# Patient Record
Sex: MALE | Race: WHITE | NOT HISPANIC OR LATINO | URBAN - METROPOLITAN AREA
[De-identification: names, ages, dates, MRNs, and addresses within clinical notes are randomized per-mention and may not be internally consistent; named-entity substitution may affect disease eponyms.]

---

## 2017-01-05 ENCOUNTER — IMPORTED ENCOUNTER (OUTPATIENT)
Dept: URBAN - METROPOLITAN AREA CLINIC 31 | Facility: CLINIC | Age: 72
End: 2017-01-05

## 2017-01-05 PROBLEM — Z96.1: Noted: 2017-01-05

## 2017-01-05 PROBLEM — H10.403: Noted: 2017-01-05

## 2017-01-05 PROCEDURE — 92015 DETERMINE REFRACTIVE STATE: CPT

## 2017-01-05 PROCEDURE — 92014 COMPRE OPH EXAM EST PT 1/>: CPT

## 2017-01-05 NOTE — PATIENT DISCUSSION
1.  Pseudophakia OU - IOLs stable. Monitor. 2. Allergic Conjunctivitis OU -- Continue [facility] allergy drops. 3. Glasses change optional. 4.  Return for an appointment in 1 year for comprehensive exam. with Dr. Apurva Encarnacion.

## 2017-03-14 NOTE — PATIENT DISCUSSION
Discussed with the patient the importance of good control of their blood sugar, blood pressure, cholesterol, diet, exercise, weight, and medication usage under the guidance of their diabetic doctor to prevent/halt diabetic retinopathy.

## 2017-03-14 NOTE — PATIENT DISCUSSION
Retinal tear and detachment warning symptoms reviewed and patient instructed to call immediately if increasing floaters, flashes, or decreasing peripheral vision.

## 2017-10-24 NOTE — PATIENT DISCUSSION
The patient's findings are compatible with epiretinal membrane with macular pucker. Macular pucker is usually due to previous posterior vitreous detachment but can also be due to uveitis, retinal vascular occlusion, retinal tear, retinal detachment, and idiopathic. In patients case due to Hx of RD.  Most patients with epiretinal membranes with macular pucker do not progress to the point where intervention is needed. Intervention is typically surgical. The patient can be observed carefully with retinal follow-up in a number of months. If the maculopathy progresses, surgery will be considered.

## 2017-10-24 NOTE — PATIENT DISCUSSION
Surgery discussed in detail, 85% stay same.  Sx elective.  Pt wishes to observe at this point.  No guarantee with surgery due to hx of RD.  Discussed likely will use Air fluid exchange.  Discussed guarded [facility] potential outlook.  Stressed pt should always be tested with distance RX on.

## 2017-12-12 NOTE — PATIENT DISCUSSION
***pt to f/u w/ Dr. Lori Jefferson for refraction to fill out DMV form, [facility] does not fill out form****.

## 2017-12-12 NOTE — PATIENT DISCUSSION
Discussed the importance of blood sugar control in the prevention of ocular complications.  DM could be contributing to edema so pt needs to monitor BS/A1c.  Pt states getting tested soon and diet controlled with weight watchers.

## 2018-01-10 ENCOUNTER — IMPORTED ENCOUNTER (OUTPATIENT)
Dept: URBAN - METROPOLITAN AREA CLINIC 31 | Facility: CLINIC | Age: 73
End: 2018-01-10

## 2018-01-10 PROBLEM — Z96.1: Noted: 2018-01-10

## 2018-01-10 PROBLEM — H10.403: Noted: 2018-01-10

## 2018-01-10 PROCEDURE — 92014 COMPRE OPH EXAM EST PT 1/>: CPT

## 2018-02-06 NOTE — PATIENT DISCUSSION
Surgery discussed in detail, 85% stay same.  Sx elective.  As edu last visit No guarantee with surgery due to hx of RD.  Discussed likely will use Air fluid exchange.  Discussed guarded [facility] potential outlook.  Stressed pt should always be tested with distance RX on.  ***Due to edema improving and VA 20/60 with glasses on do NOT rec surgical intervention***.

## 2018-09-25 NOTE — PATIENT DISCUSSION
Surgery discussed in detail, 85% stay same.  Sx elective.  As edu last visit No guarantee with surgery due to hx of RD.  Discussed guarded [facility] potential outlook due to hx RD so at this point do not rec surgical intervention. Stressed pt should always be tested with distance RX on.

## 2019-01-11 ENCOUNTER — IMPORTED ENCOUNTER (OUTPATIENT)
Dept: URBAN - METROPOLITAN AREA CLINIC 31 | Facility: CLINIC | Age: 74
End: 2019-01-11

## 2019-01-11 PROBLEM — H10.403: Noted: 2019-01-11

## 2019-01-11 PROBLEM — Z96.1: Noted: 2019-01-11

## 2019-01-11 PROCEDURE — 92015 DETERMINE REFRACTIVE STATE: CPT

## 2019-01-11 PROCEDURE — 92014 COMPRE OPH EXAM EST PT 1/>: CPT

## 2019-11-05 NOTE — PATIENT DISCUSSION
1.  Pseudophakia OU - IOLs stable. Monitor. 2. Allergic Conjunctivitis OU -- OTC allergy gtt PRN3. Return for an appointment in 1 year for comprehensive exam. with Dr. Flaco Barton. DISPLAY PLAN FREE TEXT

## 2020-01-13 ENCOUNTER — IMPORTED ENCOUNTER (OUTPATIENT)
Dept: URBAN - METROPOLITAN AREA CLINIC 31 | Facility: CLINIC | Age: 75
End: 2020-01-13

## 2020-01-13 PROBLEM — Z96.1: Noted: 2020-01-13

## 2020-01-13 PROCEDURE — 92015 DETERMINE REFRACTIVE STATE: CPT

## 2020-01-13 PROCEDURE — 92014 COMPRE OPH EXAM EST PT 1/>: CPT

## 2021-01-13 ENCOUNTER — IMPORTED ENCOUNTER (OUTPATIENT)
Dept: URBAN - METROPOLITAN AREA CLINIC 31 | Facility: CLINIC | Age: 76
End: 2021-01-13

## 2021-01-13 PROBLEM — Z96.1: Noted: 2021-01-13

## 2021-01-13 PROCEDURE — 92014 COMPRE OPH EXAM EST PT 1/>: CPT

## 2021-01-13 PROCEDURE — 92015 DETERMINE REFRACTIVE STATE: CPT

## 2021-01-13 NOTE — PATIENT DISCUSSION
1.  Pseudophakia OU - IOLs stable. Monitor. 2. Refractive error - Glasses change optional. 3.  Return for an appointment in 1 year for comprehensive exam with Dr. Goran Yu.

## 2021-03-04 NOTE — PATIENT DISCUSSION
The patient's findings are compatible with epiretinal membrane with macular pucker. Macular pucker is usually due to previous posterior vitreous detachment but can also be due to uveitis, retinal vascular occlusion, retinal tear, retinal detachment, and idiopathic. Most patients with epiretinal membranes with macular pucker do not progress to the point where intervention is needed. Intervention is typically surgical. The patient can be observed carefully with retinal follow-up in a number of months.

## 2021-03-16 NOTE — PROCEDURE NOTE: CLINICAL
PROCEDURE NOTE: Avastin () #1 OD. Diagnosis: Neovascular AMD with Active CNV. Anesthesia: Proparacaine 0.5%. Prep: Betadine Drops and Betadine Scrub. Prior to the original injection, risks/benefits/alternatives discussed including infection, loss of vision, hemorrhage, cataract, glaucoma, retinal tears or detachment. A written consent is on file, and the need for today’s injection was discussed and the patient is understanding and wishes to proceed. The off-label status of Intravitreal Avastin also was reviewed. The patient wished to proceed with treatment. The patient wished to proceed with treatment. Topical anesthetic drops were applied to the eye. Betadine prep was performed. Surgical mask worn. Sterile drape and lid speculum were applied. Using the syringe provided, Avastin 1.25 mg in 0.05 cc was injected into the vitreous cavity. Injection site: 3-4 mm from the limbus. Patient tolerated procedure well. Following the intravitreal injection, the sterile lid speculum was removed. CRA perfusion confirmed. CF vision checked. Patient given office phone number/answering service number and advised to call immediately should there be an increase in floaters or redness, loss of vision or pain, or should they have any other questions or concerns. Brandon Mireles

## 2021-04-22 NOTE — PATIENT DISCUSSION
Fluid resolved, minimal ME seen but parafoveal.  Option of treating with another Avastin inj again today and extending visit to 2 months out or observation and seeing pt back in 1 month.  Pt wishes to hold off on treatment at this time.  Rec rtc in 1 month sooner prn and if worsens will consider series of Avastin.

## 2021-05-27 NOTE — PATIENT DISCUSSION
Fluid resolved, ME resolved.  At this point can observe for now and tx prn.  Call with changes and RTC in 8 to 10 weeks.

## 2022-01-14 ENCOUNTER — IMPORTED ENCOUNTER (OUTPATIENT)
Dept: URBAN - METROPOLITAN AREA CLINIC 31 | Facility: CLINIC | Age: 77
End: 2022-01-14

## 2022-01-14 PROBLEM — Z96.1: Noted: 2022-01-14

## 2022-01-14 PROCEDURE — 92014 COMPRE OPH EXAM EST PT 1/>: CPT

## 2022-01-14 NOTE — PATIENT DISCUSSION
1.  Pseudophakia OU - IOLs stable. Monitor. 2. Refractive error - Glasses change optional. 3.  Return for an appointment in 1 year for comprehensive exam with Dr. Magdi Orbien.

## 2022-01-28 NOTE — PATIENT DISCUSSION
No changes seen on todays exam. The patient's findings are compatible with epiretinal membrane with macular pucker. Macular pucker is usually due to previous posterior vitreous detachment but can also be due to uveitis, retinal vascular occlusion, retinal tear, retinal detachment, and idiopathic. Most patients with epiretinal membranes with macular pucker do not progress to the point where intervention is needed. Intervention is typically surgical. The patient can be observed carefully with retinal follow-up in a number of months.

## 2022-01-28 NOTE — PATIENT DISCUSSION
Exam and OCT continue to show Fluid and ME resolved.  At this point can observe for now and tx prn. Patient agrees to observe at this time and will call with any changes. Patient agrees to observe at this time and will call immediately to report any decreased vision or visual distortion.  Return in 6 months for Comp/R.

## 2022-02-18 NOTE — PATIENT DISCUSSION
1.  Pseudophakia OU - IOLs stable. Monitor. 2. Allergic Conjunctivitis OU -- Continue OTC allergy gtt. 3. Return for an appointment in 1 year for comprehensive exam. with Dr. Carlos Price.
Allergic Conjunctivitis OU -- The condition was  discussed with the patient. Avoidance of allergens and cool compresses were recommended.
Return for an appointment in 1 year for comprehensive exam. with Dr. Dakotah Lee.
show

## 2022-04-02 ASSESSMENT — VISUAL ACUITY
OU_CC: J1
OD_SC: 20/25
OD_SC: 20/20-1
OS_SC: 20/25
OS_SC: 20/25+1
OD_SC: 20/20-1
OS_SC: 20/20-1
OS_SC: 20/20-1
OD_SC: 20/25+2
OD_CC: 20/30
OU_SC: 20/20-2
OS_CC: J1
OS_CC: J1+1
OS_CC: 20/60
OD_CC: J3
OD_CC: J1

## 2022-04-02 ASSESSMENT — TONOMETRY
OS_IOP_MMHG: 20
OS_IOP_MMHG: 19
OD_IOP_MMHG: 18
OD_IOP_MMHG: 20
OD_IOP_MMHG: 19
OS_IOP_MMHG: 18
OD_IOP_MMHG: 19
OD_IOP_MMHG: 18
OD_IOP_MMHG: 17
OS_IOP_MMHG: 18
OS_IOP_MMHG: 20
OS_IOP_MMHG: 19

## 2022-04-02 ASSESSMENT — PACHYMETRY
OD_CT_UM: 576
OS_CT_UM: 552

## 2023-06-22 NOTE — PATIENT DISCUSSION
1.  Pseudophakia OU - IOLs stable. Monitorfor changes in vision. 2. Refractive error - Glasses change optional. 3.  Return for an appointment in 1 year for comprehensive exam. with Dr. Cecilio Weaver. Informed patient she needs to be seen in person. Patient needed to reschedule her visit today and rescheduled to next week.

## 2023-07-18 NOTE — PATIENT DISCUSSION
Called and spoke with the patient after Dr. Murcia reviewed paperwork of disability paperwork. Informed patient that upon review, the form endorsed to clinic would be more appropriate to be filled out by his PCP Dr. Albrecht regarding his abilities (related to his COPD). Stated to the patient we would fax over the form to their office for assistance with completing. Patient verbalized understanding and will follow up with his PCP further.     Form faxed to PCP office.    Surgery discussed in detail, 85% stay same.  Sx elective.  As edu last visit No guarantee with surgery due to hx of RD.  Discussed likely will use Air fluid exchange.  Discussed guarded [facility] potential outlook.  Stressed pt should always be tested with distance RX on.  ***Due to edema improving and VA 20/60 with glasses on do NOT rec surgical intervention***.

## 2023-08-16 ENCOUNTER — EMERGENCY VISIT (OUTPATIENT)
Dept: URBAN - METROPOLITAN AREA CLINIC 29 | Facility: CLINIC | Age: 78
End: 2023-08-16

## 2023-08-16 DIAGNOSIS — H10.9: ICD-10-CM

## 2023-08-16 PROCEDURE — 99213 OFFICE O/P EST LOW 20 MIN: CPT

## 2023-08-16 RX ORDER — PREDNISOLONE ACETATE 10 MG/ML: 1 SUSPENSION/ DROPS OPHTHALMIC

## 2023-08-16 RX ORDER — ERYTHROMYCIN 5 MG/G: OINTMENT OPHTHALMIC

## 2023-08-16 ASSESSMENT — VISUAL ACUITY
OS_CC: 20/20-2
OD_CC: 20/20-3

## 2023-08-25 ENCOUNTER — FOLLOW UP (OUTPATIENT)
Dept: URBAN - METROPOLITAN AREA CLINIC 29 | Facility: CLINIC | Age: 78
End: 2023-08-25

## 2023-08-25 DIAGNOSIS — H10.9: ICD-10-CM

## 2023-08-25 PROCEDURE — 99213 OFFICE O/P EST LOW 20 MIN: CPT

## 2023-08-25 ASSESSMENT — TONOMETRY
OS_IOP_MMHG: 16
OD_IOP_MMHG: 16

## 2023-08-25 ASSESSMENT — VISUAL ACUITY
OS_CC: 20/20-1
OD_CC: 20/20-1

## 2023-10-24 NOTE — PATIENT DISCUSSION
Advised to call immediately if any worsening distortion or blurring is noted. Procedure To Be Performed At Next Visit: Excision Size Of Lesion In Cm (Optional): 0 Introduction Text (Please End With A Colon): The following procedure was deferred: Detail Level: Detailed

## 2024-02-26 ENCOUNTER — ESTABLISHED PATIENT (OUTPATIENT)
Dept: URBAN - METROPOLITAN AREA CLINIC 29 | Facility: CLINIC | Age: 79
End: 2024-02-26

## 2024-02-26 DIAGNOSIS — H52.4: ICD-10-CM

## 2024-02-26 DIAGNOSIS — H52.13: ICD-10-CM

## 2024-02-26 DIAGNOSIS — H52.223: ICD-10-CM

## 2024-02-26 PROCEDURE — 92014 COMPRE OPH EXAM EST PT 1/>: CPT

## 2024-02-26 PROCEDURE — 92015 DETERMINE REFRACTIVE STATE: CPT

## 2024-02-26 ASSESSMENT — VISUAL ACUITY
OD_CC: 20/20-1
OU_CC: 20/20
OS_CC: 20/25-1

## 2024-02-26 ASSESSMENT — TONOMETRY
OD_IOP_MMHG: 16
OS_IOP_MMHG: 16

## 2025-02-24 ENCOUNTER — COMPREHENSIVE EXAM (OUTPATIENT)
Age: 80
End: 2025-02-24

## 2025-02-24 DIAGNOSIS — H52.223: ICD-10-CM

## 2025-02-24 DIAGNOSIS — H52.4: ICD-10-CM

## 2025-02-24 DIAGNOSIS — H52.13: ICD-10-CM

## 2025-02-24 PROCEDURE — 92014 COMPRE OPH EXAM EST PT 1/>: CPT

## 2025-02-24 PROCEDURE — 92015 DETERMINE REFRACTIVE STATE: CPT

## 2025-03-25 NOTE — PATIENT DISCUSSION
Advised to call immediately if any worsening distortion or blurring is noted.
Call immediately to report any decreased vision or visual distortion.
Continue current management.
Discussed AREDS 2 supplements, UV protection and green leafy vegetables.
Discussed with the patient the importance of good control of their blood sugar, blood pressure, cholesterol, diet, exercise, weight, and medication usage under the guidance of their diabetic doctor to prevent/halt diabetic retinopathy.
Doing well, retina attached, good IOP with no signs of endophthalmitis.
Glasses Rx given.
NO DR/DME seen OU.
No new RT/RD seen.
Patient given Rx for glasses.
Recommended observation.
Recommended yearly dilated eye examinations.
See #1.
The patient's findings are compatible with epiretinal membrane with macular pucker. Macular pucker is usually due to previous posterior vitreous detachment but can also be due to uveitis, retinal vascular occlusion, retinal tear, retinal detachment, and idiopathic. Most patients with epiretinal membranes with macular pucker do not progress to the point where intervention is needed. Intervention is typically surgical. The patient can be observed carefully with retinal follow-up in a number of months.
Use artificial tears QID OU.
25-Mar-2025 00:34